# Patient Record
Sex: FEMALE | Race: BLACK OR AFRICAN AMERICAN | Employment: OTHER | URBAN - METROPOLITAN AREA
[De-identification: names, ages, dates, MRNs, and addresses within clinical notes are randomized per-mention and may not be internally consistent; named-entity substitution may affect disease eponyms.]

---

## 2020-05-26 ENCOUNTER — HOSPITAL ENCOUNTER (EMERGENCY)
Facility: HOSPITAL | Age: 65
Discharge: HOME | End: 2020-05-26
Attending: EMERGENCY MEDICINE
Payer: MEDICARE

## 2020-05-26 VITALS
SYSTOLIC BLOOD PRESSURE: 101 MMHG | TEMPERATURE: 99 F | BODY MASS INDEX: 37.22 KG/M2 | WEIGHT: 218 LBS | HEART RATE: 80 BPM | DIASTOLIC BLOOD PRESSURE: 65 MMHG | RESPIRATION RATE: 17 BRPM | HEIGHT: 64 IN | OXYGEN SATURATION: 94 %

## 2020-05-26 DIAGNOSIS — E16.2 HYPOGLYCEMIA: Primary | ICD-10-CM

## 2020-05-26 PROCEDURE — 82962 GLUCOSE BLOOD TEST: CPT

## 2020-05-26 PROCEDURE — 99283 EMERGENCY DEPT VISIT LOW MDM: CPT

## 2020-05-26 RX ORDER — ATORVASTATIN CALCIUM 10 MG/1
10 TABLET, FILM COATED ORAL NIGHTLY
COMMUNITY

## 2020-05-26 RX ORDER — ASPIRIN 81 MG/1
TABLET, CHEWABLE ORAL DAILY
COMMUNITY

## 2020-05-26 RX ORDER — LISINOPRIL 10 MG/1
10 TABLET ORAL DAILY
COMMUNITY

## 2020-05-26 RX ORDER — GLIPIZIDE 10 MG/1
10 TABLET ORAL
COMMUNITY

## 2020-05-27 NOTE — ED INITIAL ASSESSMENT (HPI)
Care assumed from EMS. Pt states that she fell asleep and when she woke up, her son stated that she \"wasn't talking right\", so he called 911. EMS states that pts BG was 30 in the field and was given oral glucose + a peanut butter sandwich from family.  Pt

## 2020-05-27 NOTE — ED PROVIDER NOTES
Patient Seen in: [facility] AND [facility] Emergency Department      History   Patient presents with:  Hypoglycemia    Stated Complaint:     HPI  76-year-old female with history of diabetes, on Tradjenta and glipizide, brought in by EMS for altered mental status 1929 123/79   Pulse 05/26/20 1929 81   Resp 05/26/20 1929 16   Temp 05/26/20 1929 98.7 °F (37.1 °C)   Temp src 05/26/20 1929 Temporal   SpO2 05/26/20 1929 97 %   O2 Device 05/26/20 2101 None (Room air)       Current:/65   Pulse 80   Temp 98.7 °F (37. followup tomorrow        Medical Record Review: I personally reviewed available prior medical records for any recent pertinent discharge summaries, testing, and procedures, and reviewed those reports. Complicating Factors:  The patient already has does n

## 2020-05-27 NOTE — ED NOTES
Verbal order from ERMD to provide juice, crackers, and sandwich for pt. Pt provided food+juice and advised to eat to maintain current BG - will re check later.